# Patient Record
Sex: MALE | Race: WHITE | URBAN - METROPOLITAN AREA
[De-identification: names, ages, dates, MRNs, and addresses within clinical notes are randomized per-mention and may not be internally consistent; named-entity substitution may affect disease eponyms.]

---

## 2020-09-23 ENCOUNTER — OFFICE VISIT (OUTPATIENT)
Dept: SURGERY | Age: 50
End: 2020-09-23
Payer: COMMERCIAL

## 2020-09-23 VITALS — TEMPERATURE: 97.5 F

## 2020-09-23 PROBLEM — D48.9 NEOPLASM OF UNCERTAIN BEHAVIOR: Status: ACTIVE | Noted: 2020-09-23

## 2020-09-23 PROCEDURE — 17003 DESTRUCT PREMALG LES 2-14: CPT | Performed by: DERMATOLOGY

## 2020-09-23 PROCEDURE — 99202 OFFICE O/P NEW SF 15 MIN: CPT | Performed by: DERMATOLOGY

## 2020-09-23 PROCEDURE — 17000 DESTRUCT PREMALG LESION: CPT | Performed by: DERMATOLOGY

## 2020-09-23 PROCEDURE — 11102 TANGNTL BX SKIN SINGLE LES: CPT | Performed by: DERMATOLOGY

## 2020-09-24 NOTE — PROGRESS NOTES
[facility]) [facility] [place]  Lyle Quach MD  [phone]      Emili Perales  1970    52 y.o. male     Date of Visit: 9/23/2020    Chief Complaint:   Chief Complaint   Patient presents with    Other     Spot check - L eye        CC:   Changing lesion on left nasofacial sulcus    History of Present Illness:  1. Pt c/o rough brown lesion on left nasofacial sulcus. Forms crust and it gets irritated. No previous tx. 2. Pt c/o red rough spots on right cheek. No previous tx. No alleviating or exacerbating factors. History of skin cancer:  none  Family history of Melanoma: none    Review of Systems:  Constitutional: Reports general sense of well-being. Skin: Denies any other new or changing moles. no tendency to develop thick scars. Heme: no abnormal bleeding/bruising. Past Medical History:   Diagnosis Date    Kidney stone      Past Surgical History:   Procedure Laterality Date    KNEE SURGERY      4-5 x left knee       No Known Allergies  Outpatient Medications Marked as Taking for the 9/23/20 encounter (Office Visit) with Misty Ralph MD   Medication Sig Dispense Refill    ibuprofen (ADVIL;MOTRIN) 200 MG tablet Take 200 mg by mouth every 6 hours as needed. 600 mg at 9am         Sunburn/tanning history:  Pt does not wear sunscreen on face. Social History: sunscreen use see above, tobacco use no     Physical Examination     Vitals:  Temp 97.5 °F (36.4 °C) (Infrared)     [place] Skin Type 2/3    -General: Well-appearing, NAD    Skin exam of face, ears, eyelids  1. Left nasofacial sulcus is a 10mm brown rough papule  2. Right cheek are 2 erythematous minimally keratotic papules    Assessment and Plan       1. Neoplasm of uncertain behavior     Neoplasm of uncertain behavior:  R/O solar lentigo/flat SK vs. Other  Location: left nasofacial sulcus  - Discussed possible diagnosis. Patient agreeable to biopsy.  Verbal and written consent obtained after risks (infection, bleeding, scar), benefits and alternatives explained. - The area to be biopsied was marked with a surgical marking pen and a verbal time-out was performed. - Local anesthesia was achieved with 1% lidocaine with epinephrine/sodium bicarbonate. - The area was cleansed with alcohol and a tangential shave biopsy was performed using a derma-blade. Hemostasis was achieved with aluminum chloride. A small amount of petroleum jelly was applied to the wound and a band-aid applied.   -The specimen was placed in a correctly identified specimen container.    - One specimen was sent to pathology. There were no immediate complications and the patient left the office in good condition.  -  Patient educated re: risk of bleeding, infection, scar and wound care instructions were reviewed. The patient will be informed of biopsy results by phone or letter as soon as available. 2. Liquid nitrogen cryotherapy for actinic keratosis X 2 on right cheek. After risk associated with the procedure including but not limited to pain, bleeding, infection, blister formation, dyspigmention, scar formation and possible need for another treatment at a later date discussed with the patient, verbal (or written) consent was obtained with teach back. Area was treated with liquid nitrogen cryotherapy in 2 freeze-thaw cycles to 2 lesion(s). Patient tolerated the procedure well, there were no immediate complications, no blood loss, after care discussed with the patient. Patient left the unit in stable good medical condition. Patient will follow up as needed. The nature of sun-induced photo-aging and skin cancers/pre-cancers was discussed. Sun avoidance, protective clothing, and the use of 30-SPF sunscreens is advised. Observe closely for skin damage/changes, and call if such occurs.

## 2020-09-25 LAB — DERMATOLOGY PATHOLOGY REPORT: NORMAL

## 2020-09-28 ENCOUNTER — TELEPHONE (OUTPATIENT)
Dept: SURGERY | Age: 50
End: 2020-09-28